# Patient Record
Sex: FEMALE | Race: WHITE | Employment: OTHER | URBAN - METROPOLITAN AREA
[De-identification: names, ages, dates, MRNs, and addresses within clinical notes are randomized per-mention and may not be internally consistent; named-entity substitution may affect disease eponyms.]

---

## 2021-09-24 ENCOUNTER — HOSPITAL ENCOUNTER (INPATIENT)
Facility: CLINIC | Age: 81
Setting detail: SURGERY ADMIT
End: 2021-09-24
Attending: ORTHOPAEDIC SURGERY | Admitting: ORTHOPAEDIC SURGERY
Payer: MEDICARE

## 2021-10-21 DIAGNOSIS — Z11.59 ENCOUNTER FOR SCREENING FOR OTHER VIRAL DISEASES: ICD-10-CM

## 2021-11-09 ENCOUNTER — TRANSFERRED RECORDS (OUTPATIENT)
Dept: HEALTH INFORMATION MANAGEMENT | Facility: CLINIC | Age: 81
End: 2021-11-09
Payer: MEDICARE

## 2021-11-11 VITALS — BODY MASS INDEX: 18.96 KG/M2 | WEIGHT: 107 LBS | HEIGHT: 63 IN

## 2021-11-11 RX ORDER — CHOLECALCIFEROL (VITAMIN D3) 50 MCG
1 TABLET ORAL DAILY
COMMUNITY
End: 2021-12-22

## 2021-11-11 ASSESSMENT — MIFFLIN-ST. JEOR: SCORE: 921.54

## 2021-11-17 ENCOUNTER — MEDICAL CORRESPONDENCE (OUTPATIENT)
Dept: HEALTH INFORMATION MANAGEMENT | Facility: CLINIC | Age: 81
End: 2021-11-17
Payer: MEDICARE

## 2021-11-17 NOTE — PHARMACY-ADMISSION MEDICATION HISTORY
Medication history and patient interview completed by pharmacy intern/student or pre-admitting RN.  Reviewed by pharmacist, including [employer] dispense records, [facility] Care Everywhere, and chart review.       Charles Bingham, Pharm.D., BCPS      Nurse Complete Set By: Ladonna Bill, RN at 11/11/2021 1:58 PM        Prior to Admission medications    Medication Sig Last Dose Taking? Auth Provider   aspirin (ASA) 81 MG EC tablet Take 81 mg by mouth daily  Yes Reported, Patient   CALCIUM CARBONATE-VITAMIN D PO Take 1 tablet by mouth daily   Yes Reported, Patient   vitamin D3 (CHOLECALCIFEROL) 50 mcg (2000 units) tablet Take 1 tablet by mouth daily  Yes Reported, Patient

## 2021-12-22 ASSESSMENT — MIFFLIN-ST. JEOR: SCORE: 917

## 2021-12-25 NOTE — PHARMACY-ADMISSION MEDICATION HISTORY
Medication reconciliation completed by pre-admitting.    Prior to Admission medications    Medication Sig Last Dose Taking? Auth Provider   aspirin (ASA) 81 MG EC tablet Take 81 mg by mouth daily 12/19/2021 Yes Reported, Patient   Calcium-Magnesium-Vitamin D (CALCIUM MAGNESIUM PO) Take 2 capsules by mouth every morning  Yes Reported, Patient   Vitamin D-Vitamin K (VITAMIN K2-VITAMIN D3)  MCG-UNIT CAPS Take 1 tablet by mouth daily  Yes Reported, Patient

## 2021-12-26 ENCOUNTER — LAB (OUTPATIENT)
Dept: URGENT CARE | Facility: URGENT CARE | Age: 81
End: 2021-12-26
Attending: ORTHOPAEDIC SURGERY
Payer: MEDICARE

## 2021-12-26 DIAGNOSIS — Z11.59 ENCOUNTER FOR SCREENING FOR OTHER VIRAL DISEASES: ICD-10-CM

## 2021-12-26 PROCEDURE — U0005 INFEC AGEN DETEC AMPLI PROBE: HCPCS

## 2021-12-26 PROCEDURE — U0003 INFECTIOUS AGENT DETECTION BY NUCLEIC ACID (DNA OR RNA); SEVERE ACUTE RESPIRATORY SYNDROME CORONAVIRUS 2 (SARS-COV-2) (CORONAVIRUS DISEASE [COVID-19]), AMPLIFIED PROBE TECHNIQUE, MAKING USE OF HIGH THROUGHPUT TECHNOLOGIES AS DESCRIBED BY CMS-2020-01-R: HCPCS

## 2021-12-27 LAB — SARS-COV-2 RNA RESP QL NAA+PROBE: NEGATIVE

## 2021-12-29 ENCOUNTER — HOSPITAL ENCOUNTER (INPATIENT)
Facility: CLINIC | Age: 81
LOS: 2 days | Discharge: HOME | DRG: 455 | End: 2021-12-31
Attending: ORTHOPAEDIC SURGERY | Admitting: ORTHOPAEDIC SURGERY
Payer: MEDICARE

## 2021-12-29 ENCOUNTER — APPOINTMENT (OUTPATIENT)
Dept: GENERAL RADIOLOGY | Facility: CLINIC | Age: 81
DRG: 455 | End: 2021-12-29
Attending: ORTHOPAEDIC SURGERY
Payer: MEDICARE

## 2021-12-29 ENCOUNTER — ANESTHESIA EVENT (OUTPATIENT)
Dept: SURGERY | Facility: CLINIC | Age: 81
DRG: 455 | End: 2021-12-29
Payer: MEDICARE

## 2021-12-29 ENCOUNTER — ANESTHESIA (OUTPATIENT)
Dept: SURGERY | Facility: CLINIC | Age: 81
DRG: 455 | End: 2021-12-29
Payer: MEDICARE

## 2021-12-29 DIAGNOSIS — Z98.1 S/P LUMBAR FUSION: Primary | ICD-10-CM

## 2021-12-29 LAB
ABO/RH(D): NORMAL
ABO/RH(D): NORMAL
ANTIBODY SCREEN: NEGATIVE
HGB BLD-MCNC: 13.8 G/DL (ref 11.7–15.7)
SPECIMEN EXPIRATION DATE: NORMAL
SPECIMEN EXPIRATION DATE: NORMAL

## 2021-12-29 PROCEDURE — 0SG0071 FUSION OF LUMBAR VERTEBRAL JOINT WITH AUTOLOGOUS TISSUE SUBSTITUTE, POSTERIOR APPROACH, POSTERIOR COLUMN, OPEN APPROACH: ICD-10-PCS | Performed by: ORTHOPAEDIC SURGERY

## 2021-12-29 PROCEDURE — 258N000003 HC RX IP 258 OP 636: Performed by: ORTHOPAEDIC SURGERY

## 2021-12-29 PROCEDURE — 999N000141 HC STATISTIC PRE-PROCEDURE NURSING ASSESSMENT: Performed by: ORTHOPAEDIC SURGERY

## 2021-12-29 PROCEDURE — 272N000001 HC OR GENERAL SUPPLY STERILE: Performed by: ORTHOPAEDIC SURGERY

## 2021-12-29 PROCEDURE — 250N000011 HC RX IP 250 OP 636: Performed by: PHYSICIAN ASSISTANT

## 2021-12-29 PROCEDURE — 250N000009 HC RX 250: Performed by: NURSE ANESTHETIST, CERTIFIED REGISTERED

## 2021-12-29 PROCEDURE — 0SG10AJ FUSION OF 2 OR MORE LUMBAR VERTEBRAL JOINTS WITH INTERBODY FUSION DEVICE, POSTERIOR APPROACH, ANTERIOR COLUMN, OPEN APPROACH: ICD-10-PCS | Performed by: ORTHOPAEDIC SURGERY

## 2021-12-29 PROCEDURE — 86901 BLOOD TYPING SEROLOGIC RH(D): CPT | Performed by: ANESTHESIOLOGY

## 2021-12-29 PROCEDURE — 250N000011 HC RX IP 250 OP 636: Performed by: NURSE ANESTHETIST, CERTIFIED REGISTERED

## 2021-12-29 PROCEDURE — 86850 RBC ANTIBODY SCREEN: CPT | Performed by: ANESTHESIOLOGY

## 2021-12-29 PROCEDURE — 8E0WXBF COMPUTER ASSISTED PROCEDURE OF TRUNK REGION, WITH FLUOROSCOPY: ICD-10-PCS | Performed by: ORTHOPAEDIC SURGERY

## 2021-12-29 PROCEDURE — C1713 ANCHOR/SCREW BN/BN,TIS/BN: HCPCS | Performed by: ORTHOPAEDIC SURGERY

## 2021-12-29 PROCEDURE — 120N000001 HC R&B MED SURG/OB

## 2021-12-29 PROCEDURE — 85018 HEMOGLOBIN: CPT | Performed by: ANESTHESIOLOGY

## 2021-12-29 PROCEDURE — 370N000017 HC ANESTHESIA TECHNICAL FEE, PER MIN: Performed by: ORTHOPAEDIC SURGERY

## 2021-12-29 PROCEDURE — 258N000003 HC RX IP 258 OP 636: Performed by: ANESTHESIOLOGY

## 2021-12-29 PROCEDURE — C1762 CONN TISS, HUMAN(INC FASCIA): HCPCS | Performed by: ORTHOPAEDIC SURGERY

## 2021-12-29 PROCEDURE — 258N000003 HC RX IP 258 OP 636: Performed by: PHYSICIAN ASSISTANT

## 2021-12-29 PROCEDURE — 00NY0ZZ RELEASE LUMBAR SPINAL CORD, OPEN APPROACH: ICD-10-PCS | Performed by: ORTHOPAEDIC SURGERY

## 2021-12-29 PROCEDURE — 999N000179 XR SURGERY CARM FLUORO LESS THAN 5 MIN W STILLS: Mod: TC

## 2021-12-29 PROCEDURE — 250N000013 HC RX MED GY IP 250 OP 250 PS 637: Performed by: PHYSICIAN ASSISTANT

## 2021-12-29 PROCEDURE — 250N000011 HC RX IP 250 OP 636: Performed by: ANESTHESIOLOGY

## 2021-12-29 PROCEDURE — 710N000009 HC RECOVERY PHASE 1, LEVEL 1, PER MIN: Performed by: ORTHOPAEDIC SURGERY

## 2021-12-29 PROCEDURE — 272N000004 HC RX 272: Performed by: ORTHOPAEDIC SURGERY

## 2021-12-29 PROCEDURE — 01NB0ZZ RELEASE LUMBAR NERVE, OPEN APPROACH: ICD-10-PCS | Performed by: ORTHOPAEDIC SURGERY

## 2021-12-29 PROCEDURE — 360N000085 HC SURGERY LEVEL 5 W/ FLUORO, PER MIN: Performed by: ORTHOPAEDIC SURGERY

## 2021-12-29 PROCEDURE — 258N000003 HC RX IP 258 OP 636: Performed by: NURSE ANESTHETIST, CERTIFIED REGISTERED

## 2021-12-29 PROCEDURE — 250N000009 HC RX 250: Performed by: ORTHOPAEDIC SURGERY

## 2021-12-29 PROCEDURE — 36415 COLL VENOUS BLD VENIPUNCTURE: CPT | Performed by: ANESTHESIOLOGY

## 2021-12-29 PROCEDURE — 0SB20ZZ EXCISION OF LUMBAR VERTEBRAL DISC, OPEN APPROACH: ICD-10-PCS | Performed by: ORTHOPAEDIC SURGERY

## 2021-12-29 PROCEDURE — 0SG00AJ FUSION OF LUMBAR VERTEBRAL JOINT WITH INTERBODY FUSION DEVICE, POSTERIOR APPROACH, ANTERIOR COLUMN, OPEN APPROACH: ICD-10-PCS | Performed by: ORTHOPAEDIC SURGERY

## 2021-12-29 DEVICE — GRAFT BONE MAGNIFUSE 1CMX5CM 7509215: Type: IMPLANTABLE DEVICE | Site: SPINE LUMBAR | Status: FUNCTIONAL

## 2021-12-29 DEVICE — GRAFT BONE CRUSH CANC 15ML 400075: Type: IMPLANTABLE DEVICE | Site: SPINE LUMBAR | Status: FUNCTIONAL

## 2021-12-29 DEVICE — IMPLANTABLE DEVICE: Type: IMPLANTABLE DEVICE | Site: SPINE LUMBAR | Status: FUNCTIONAL

## 2021-12-29 DEVICE — GRAFT BONE PUTTY DBX 10ML 038100: Type: IMPLANTABLE DEVICE | Site: SPINE LUMBAR | Status: FUNCTIONAL

## 2021-12-29 RX ORDER — SODIUM CHLORIDE, SODIUM LACTATE, POTASSIUM CHLORIDE, CALCIUM CHLORIDE 600; 310; 30; 20 MG/100ML; MG/100ML; MG/100ML; MG/100ML
INJECTION, SOLUTION INTRAVENOUS CONTINUOUS
Status: DISCONTINUED | OUTPATIENT
Start: 2021-12-29 | End: 2021-12-29 | Stop reason: HOSPADM

## 2021-12-29 RX ORDER — LIDOCAINE 40 MG/G
CREAM TOPICAL
Status: DISCONTINUED | OUTPATIENT
Start: 2021-12-29 | End: 2021-12-29 | Stop reason: HOSPADM

## 2021-12-29 RX ORDER — HYDROXYZINE HYDROCHLORIDE 10 MG/1
10 TABLET, FILM COATED ORAL EVERY 6 HOURS PRN
Status: DISCONTINUED | OUTPATIENT
Start: 2021-12-29 | End: 2021-12-31 | Stop reason: HOSPADM

## 2021-12-29 RX ORDER — NALOXONE HYDROCHLORIDE 0.4 MG/ML
0.4 INJECTION, SOLUTION INTRAMUSCULAR; INTRAVENOUS; SUBCUTANEOUS
Status: DISCONTINUED | OUTPATIENT
Start: 2021-12-29 | End: 2021-12-31 | Stop reason: HOSPADM

## 2021-12-29 RX ORDER — PROPOFOL 10 MG/ML
INJECTION, EMULSION INTRAVENOUS PRN
Status: DISCONTINUED | OUTPATIENT
Start: 2021-12-29 | End: 2021-12-29

## 2021-12-29 RX ORDER — ONDANSETRON 4 MG/1
4 TABLET, ORALLY DISINTEGRATING ORAL EVERY 6 HOURS PRN
Status: DISCONTINUED | OUTPATIENT
Start: 2021-12-29 | End: 2021-12-31 | Stop reason: HOSPADM

## 2021-12-29 RX ORDER — ACETAMINOPHEN 325 MG/1
650 TABLET ORAL EVERY 4 HOURS PRN
Status: DISCONTINUED | OUTPATIENT
Start: 2022-01-01 | End: 2021-12-31 | Stop reason: HOSPADM

## 2021-12-29 RX ORDER — LIDOCAINE 40 MG/G
CREAM TOPICAL
Status: DISCONTINUED | OUTPATIENT
Start: 2021-12-29 | End: 2021-12-31 | Stop reason: HOSPADM

## 2021-12-29 RX ORDER — NALOXONE HYDROCHLORIDE 0.4 MG/ML
0.2 INJECTION, SOLUTION INTRAMUSCULAR; INTRAVENOUS; SUBCUTANEOUS
Status: DISCONTINUED | OUTPATIENT
Start: 2021-12-29 | End: 2021-12-31 | Stop reason: HOSPADM

## 2021-12-29 RX ORDER — SODIUM CHLORIDE 9 MG/ML
INJECTION, SOLUTION INTRAVENOUS CONTINUOUS
Status: DISCONTINUED | OUTPATIENT
Start: 2021-12-29 | End: 2021-12-31 | Stop reason: HOSPADM

## 2021-12-29 RX ORDER — DEXAMETHASONE SODIUM PHOSPHATE 4 MG/ML
INJECTION, SOLUTION INTRA-ARTICULAR; INTRALESIONAL; INTRAMUSCULAR; INTRAVENOUS; SOFT TISSUE PRN
Status: DISCONTINUED | OUTPATIENT
Start: 2021-12-29 | End: 2021-12-29

## 2021-12-29 RX ORDER — NEOSTIGMINE METHYLSULFATE 1 MG/ML
VIAL (ML) INJECTION PRN
Status: DISCONTINUED | OUTPATIENT
Start: 2021-12-29 | End: 2021-12-29

## 2021-12-29 RX ORDER — CEFAZOLIN SODIUM/WATER 2 G/20 ML
2 SYRINGE (ML) INTRAVENOUS SEE ADMIN INSTRUCTIONS
Status: DISCONTINUED | OUTPATIENT
Start: 2021-12-29 | End: 2021-12-29 | Stop reason: HOSPADM

## 2021-12-29 RX ORDER — GLYCOPYRROLATE 0.2 MG/ML
INJECTION, SOLUTION INTRAMUSCULAR; INTRAVENOUS PRN
Status: DISCONTINUED | OUTPATIENT
Start: 2021-12-29 | End: 2021-12-29

## 2021-12-29 RX ORDER — GABAPENTIN 100 MG/1
100 CAPSULE ORAL
Status: COMPLETED | OUTPATIENT
Start: 2021-12-29 | End: 2021-12-29

## 2021-12-29 RX ORDER — OXYCODONE HYDROCHLORIDE 5 MG/1
5 TABLET ORAL EVERY 4 HOURS PRN
Status: DISCONTINUED | OUTPATIENT
Start: 2021-12-29 | End: 2021-12-29 | Stop reason: HOSPADM

## 2021-12-29 RX ORDER — POLYETHYLENE GLYCOL 3350 17 G/17G
17 POWDER, FOR SOLUTION ORAL DAILY
Status: DISCONTINUED | OUTPATIENT
Start: 2021-12-30 | End: 2021-12-31 | Stop reason: HOSPADM

## 2021-12-29 RX ORDER — ONDANSETRON 2 MG/ML
INJECTION INTRAMUSCULAR; INTRAVENOUS PRN
Status: DISCONTINUED | OUTPATIENT
Start: 2021-12-29 | End: 2021-12-29

## 2021-12-29 RX ORDER — BISACODYL 10 MG
10 SUPPOSITORY, RECTAL RECTAL DAILY PRN
Status: DISCONTINUED | OUTPATIENT
Start: 2021-12-29 | End: 2021-12-31 | Stop reason: HOSPADM

## 2021-12-29 RX ORDER — LIDOCAINE HYDROCHLORIDE 10 MG/ML
INJECTION, SOLUTION EPIDURAL; INFILTRATION; INTRACAUDAL; PERINEURAL PRN
Status: DISCONTINUED | OUTPATIENT
Start: 2021-12-29 | End: 2021-12-29

## 2021-12-29 RX ORDER — EPHEDRINE SULFATE 50 MG/ML
INJECTION, SOLUTION INTRAMUSCULAR; INTRAVENOUS; SUBCUTANEOUS PRN
Status: DISCONTINUED | OUTPATIENT
Start: 2021-12-29 | End: 2021-12-29

## 2021-12-29 RX ORDER — OXYCODONE HYDROCHLORIDE 5 MG/1
10 TABLET ORAL EVERY 4 HOURS PRN
Status: DISCONTINUED | OUTPATIENT
Start: 2021-12-29 | End: 2021-12-31 | Stop reason: HOSPADM

## 2021-12-29 RX ORDER — FENTANYL CITRATE 50 UG/ML
INJECTION, SOLUTION INTRAMUSCULAR; INTRAVENOUS PRN
Status: DISCONTINUED | OUTPATIENT
Start: 2021-12-29 | End: 2021-12-29

## 2021-12-29 RX ORDER — PROCHLORPERAZINE MALEATE 5 MG
5 TABLET ORAL EVERY 6 HOURS PRN
Status: DISCONTINUED | OUTPATIENT
Start: 2021-12-29 | End: 2021-12-31 | Stop reason: HOSPADM

## 2021-12-29 RX ORDER — BUPIVACAINE HYDROCHLORIDE AND EPINEPHRINE 2.5; 5 MG/ML; UG/ML
INJECTION, SOLUTION EPIDURAL; INFILTRATION; INTRACAUDAL; PERINEURAL PRN
Status: DISCONTINUED | OUTPATIENT
Start: 2021-12-29 | End: 2021-12-29 | Stop reason: HOSPADM

## 2021-12-29 RX ORDER — ONDANSETRON 4 MG/1
4 TABLET, ORALLY DISINTEGRATING ORAL EVERY 30 MIN PRN
Status: DISCONTINUED | OUTPATIENT
Start: 2021-12-29 | End: 2021-12-29 | Stop reason: HOSPADM

## 2021-12-29 RX ORDER — HYDROMORPHONE HCL IN WATER/PF 6 MG/30 ML
0.2 PATIENT CONTROLLED ANALGESIA SYRINGE INTRAVENOUS EVERY 5 MIN PRN
Status: DISCONTINUED | OUTPATIENT
Start: 2021-12-29 | End: 2021-12-29 | Stop reason: HOSPADM

## 2021-12-29 RX ORDER — OXYCODONE HYDROCHLORIDE 5 MG/1
5 TABLET ORAL EVERY 4 HOURS PRN
Status: DISCONTINUED | OUTPATIENT
Start: 2021-12-29 | End: 2021-12-31 | Stop reason: HOSPADM

## 2021-12-29 RX ORDER — CEFAZOLIN SODIUM/WATER 2 G/20 ML
2 SYRINGE (ML) INTRAVENOUS
Status: DISCONTINUED | OUTPATIENT
Start: 2021-12-29 | End: 2021-12-29 | Stop reason: HOSPADM

## 2021-12-29 RX ORDER — ONDANSETRON 2 MG/ML
4 INJECTION INTRAMUSCULAR; INTRAVENOUS EVERY 30 MIN PRN
Status: DISCONTINUED | OUTPATIENT
Start: 2021-12-29 | End: 2021-12-29 | Stop reason: HOSPADM

## 2021-12-29 RX ORDER — CEFAZOLIN SODIUM 1 G/50ML
1 INJECTION, SOLUTION INTRAVENOUS EVERY 8 HOURS
Status: COMPLETED | OUTPATIENT
Start: 2021-12-29 | End: 2021-12-30

## 2021-12-29 RX ORDER — ONDANSETRON 2 MG/ML
4 INJECTION INTRAMUSCULAR; INTRAVENOUS EVERY 6 HOURS PRN
Status: DISCONTINUED | OUTPATIENT
Start: 2021-12-29 | End: 2021-12-31 | Stop reason: HOSPADM

## 2021-12-29 RX ORDER — HYDROMORPHONE HCL IN WATER/PF 6 MG/30 ML
0.4 PATIENT CONTROLLED ANALGESIA SYRINGE INTRAVENOUS
Status: DISCONTINUED | OUTPATIENT
Start: 2021-12-29 | End: 2021-12-31 | Stop reason: HOSPADM

## 2021-12-29 RX ORDER — VITAMIN B COMPLEX
25 TABLET ORAL 2 TIMES DAILY
Status: DISCONTINUED | OUTPATIENT
Start: 2021-12-29 | End: 2021-12-31 | Stop reason: HOSPADM

## 2021-12-29 RX ORDER — FENTANYL CITRATE 50 UG/ML
25 INJECTION, SOLUTION INTRAMUSCULAR; INTRAVENOUS EVERY 5 MIN PRN
Status: DISCONTINUED | OUTPATIENT
Start: 2021-12-29 | End: 2021-12-29 | Stop reason: HOSPADM

## 2021-12-29 RX ORDER — AMOXICILLIN 250 MG
1 CAPSULE ORAL 2 TIMES DAILY
Status: DISCONTINUED | OUTPATIENT
Start: 2021-12-29 | End: 2021-12-31 | Stop reason: HOSPADM

## 2021-12-29 RX ORDER — ACETAMINOPHEN 325 MG/1
975 TABLET ORAL EVERY 8 HOURS
Status: DISCONTINUED | OUTPATIENT
Start: 2021-12-29 | End: 2021-12-31 | Stop reason: HOSPADM

## 2021-12-29 RX ORDER — HYDROMORPHONE HCL IN WATER/PF 6 MG/30 ML
0.2 PATIENT CONTROLLED ANALGESIA SYRINGE INTRAVENOUS
Status: DISCONTINUED | OUTPATIENT
Start: 2021-12-29 | End: 2021-12-31 | Stop reason: HOSPADM

## 2021-12-29 RX ORDER — MAGNESIUM HYDROXIDE 1200 MG/15ML
LIQUID ORAL PRN
Status: DISCONTINUED | OUTPATIENT
Start: 2021-12-29 | End: 2021-12-29 | Stop reason: HOSPADM

## 2021-12-29 RX ORDER — PROPOFOL 10 MG/ML
INJECTION, EMULSION INTRAVENOUS CONTINUOUS PRN
Status: DISCONTINUED | OUTPATIENT
Start: 2021-12-29 | End: 2021-12-29

## 2021-12-29 RX ADMIN — SODIUM CHLORIDE, POTASSIUM CHLORIDE, SODIUM LACTATE AND CALCIUM CHLORIDE: 600; 310; 30; 20 INJECTION, SOLUTION INTRAVENOUS at 10:00

## 2021-12-29 RX ADMIN — GABAPENTIN 100 MG: 100 CAPSULE ORAL at 08:59

## 2021-12-29 RX ADMIN — FENTANYL CITRATE 50 MCG: 50 INJECTION, SOLUTION INTRAMUSCULAR; INTRAVENOUS at 12:51

## 2021-12-29 RX ADMIN — SENNOSIDES AND DOCUSATE SODIUM 1 TABLET: 50; 8.6 TABLET ORAL at 20:59

## 2021-12-29 RX ADMIN — NEOSTIGMINE METHYLSULFATE 3.5 MG: 1 INJECTION, SOLUTION INTRAVENOUS at 12:30

## 2021-12-29 RX ADMIN — CEFAZOLIN SODIUM 1 G: 1 INJECTION, SOLUTION INTRAVENOUS at 17:59

## 2021-12-29 RX ADMIN — Medication 5 MG: at 11:08

## 2021-12-29 RX ADMIN — Medication 5 MG: at 10:54

## 2021-12-29 RX ADMIN — GLYCOPYRROLATE 0.5 MG: 0.2 INJECTION, SOLUTION INTRAMUSCULAR; INTRAVENOUS at 12:31

## 2021-12-29 RX ADMIN — DEXAMETHASONE SODIUM PHOSPHATE 4 MG: 4 INJECTION, SOLUTION INTRA-ARTICULAR; INTRALESIONAL; INTRAMUSCULAR; INTRAVENOUS; SOFT TISSUE at 10:08

## 2021-12-29 RX ADMIN — FENTANYL CITRATE 50 MCG: 50 INJECTION, SOLUTION INTRAMUSCULAR; INTRAVENOUS at 10:08

## 2021-12-29 RX ADMIN — GLYCOPYRROLATE 0.1 MG: 0.2 INJECTION, SOLUTION INTRAMUSCULAR; INTRAVENOUS at 10:08

## 2021-12-29 RX ADMIN — FENTANYL CITRATE 25 MCG: 50 INJECTION, SOLUTION INTRAMUSCULAR; INTRAVENOUS at 14:33

## 2021-12-29 RX ADMIN — SODIUM CHLORIDE: 9 INJECTION, SOLUTION INTRAVENOUS at 17:59

## 2021-12-29 RX ADMIN — Medication 2 G: at 09:59

## 2021-12-29 RX ADMIN — SODIUM CHLORIDE, POTASSIUM CHLORIDE, SODIUM LACTATE AND CALCIUM CHLORIDE: 600; 310; 30; 20 INJECTION, SOLUTION INTRAVENOUS at 11:03

## 2021-12-29 RX ADMIN — PHENYLEPHRINE HYDROCHLORIDE 100 MCG: 10 INJECTION INTRAVENOUS at 10:28

## 2021-12-29 RX ADMIN — PHENYLEPHRINE HYDROCHLORIDE 100 MCG: 10 INJECTION INTRAVENOUS at 10:22

## 2021-12-29 RX ADMIN — ONDANSETRON HYDROCHLORIDE 4 MG: 2 INJECTION, SOLUTION INTRAVENOUS at 12:11

## 2021-12-29 RX ADMIN — FENTANYL CITRATE 50 MCG: 50 INJECTION, SOLUTION INTRAMUSCULAR; INTRAVENOUS at 11:09

## 2021-12-29 RX ADMIN — PHENYLEPHRINE HYDROCHLORIDE 0.2 MCG/KG/MIN: 10 INJECTION INTRAVENOUS at 10:41

## 2021-12-29 RX ADMIN — PHENYLEPHRINE HYDROCHLORIDE 100 MCG: 10 INJECTION INTRAVENOUS at 10:41

## 2021-12-29 RX ADMIN — ROCURONIUM BROMIDE 40 MG: 50 INJECTION, SOLUTION INTRAVENOUS at 10:08

## 2021-12-29 RX ADMIN — PROPOFOL 110 MG: 10 INJECTION, EMULSION INTRAVENOUS at 10:08

## 2021-12-29 RX ADMIN — FENTANYL CITRATE 50 MCG: 50 INJECTION, SOLUTION INTRAMUSCULAR; INTRAVENOUS at 12:05

## 2021-12-29 RX ADMIN — SUGAMMADEX 100 MG: 100 INJECTION, SOLUTION INTRAVENOUS at 12:43

## 2021-12-29 RX ADMIN — FENTANYL CITRATE 25 MCG: 50 INJECTION, SOLUTION INTRAMUSCULAR; INTRAVENOUS at 13:28

## 2021-12-29 RX ADMIN — PHENYLEPHRINE HYDROCHLORIDE 100 MCG: 10 INJECTION INTRAVENOUS at 10:49

## 2021-12-29 RX ADMIN — FENTANYL CITRATE 50 MCG: 50 INJECTION, SOLUTION INTRAMUSCULAR; INTRAVENOUS at 10:28

## 2021-12-29 RX ADMIN — ACETAMINOPHEN 975 MG: 325 TABLET, FILM COATED ORAL at 21:01

## 2021-12-29 RX ADMIN — PROPOFOL 75 MCG/KG/MIN: 10 INJECTION, EMULSION INTRAVENOUS at 10:25

## 2021-12-29 RX ADMIN — LIDOCAINE HYDROCHLORIDE 20 MG: 10 INJECTION, SOLUTION EPIDURAL; INFILTRATION; INTRACAUDAL; PERINEURAL at 10:08

## 2021-12-29 ASSESSMENT — ACTIVITIES OF DAILY LIVING (ADL)
ADLS_ACUITY_SCORE: 5
ADLS_ACUITY_SCORE: 4
ADLS_ACUITY_SCORE: 5

## 2021-12-29 ASSESSMENT — MIFFLIN-ST. JEOR: SCORE: 910.65

## 2021-12-29 NOTE — ANESTHESIA PROCEDURE NOTES
Airway       Patient location during procedure: OR       Procedure Start/Stop Times: 12/29/2021 10:12 AM  Staff -        Performed By: CRNA  Consent for Airway        Urgency: elective  Indications and Patient Condition       Indications for airway management: luan-procedural       Induction type:intravenous       Mask difficulty assessment: 2 - vent by mask + OA or adjuvant +/- NMBA    Final Airway Details       Final airway type: endotracheal airway       Successful airway: ETT - single  Endotracheal Airway Details        ETT size (mm): 7.0       Successful intubation technique: direct laryngoscopy       DL Blade Type: Davison 2       Grade View of Cords: 1       Adjucts: stylet       Position: Right       Measured from: gums/teeth       Secured at (cm): 20       Bite block used: Soft    Post intubation assessment        Placement verified by: capnometry        Number of attempts at approach: 1       Secured with: plastic tape       Ease of procedure: easy       Dentition: Intact

## 2021-12-29 NOTE — ANESTHESIA CARE TRANSFER NOTE
Patient: Aurora Williamson    Procedure: Procedure(s):  Lumbar 4-5 posterior lumbar instrumented fusion with  interbody cage and with laminectomies       Diagnosis: DJD (degenerative joint disease) [M19.90]  Stenosis, spinal, lumbar [M48.061]  Radiculopathy [M54.10]  Spondylolisthesis of lumbar region [M43.16]  Diagnosis Additional Information: No value filed.    Anesthesia Type:   General     Note:    Oropharynx: oropharynx clear of all foreign objects  Level of Consciousness: awake  Oxygen Supplementation: face mask    Independent Airway: airway patency satisfactory and stable  Dentition: dentition unchanged  Vital Signs Stable: post-procedure vital signs reviewed and stable  Report to RN Given: handoff report given  Patient transferred to: PACU    Handoff Report: Identifed the Patient, Identified the Reponsible Provider, Reviewed the pertinent medical history, Discussed the surgical course, Reviewed Intra-OP anesthesia mangement and issues during anesthesia, Set expectations for post-procedure period and Allowed opportunity for questions and acknowledgement of understanding      Vitals:  Vitals Value Taken Time   /52 12/29/21 1245   Temp     Pulse 100 12/29/21 1250   Resp 18 12/29/21 1250   SpO2 100 % 12/29/21 1250   Vitals shown include unvalidated device data.    Electronically Signed By: NIC Lamb CRNA  December 29, 2021  12:51 PM

## 2021-12-29 NOTE — ANESTHESIA POSTPROCEDURE EVALUATION
Patient: Aurora Williamson    Procedure: Procedure(s):  Lumbar 4-5 posterior lumbar instrumented fusion with  interbody cage and with laminectomies       Diagnosis:DJD (degenerative joint disease) [M19.90]  Stenosis, spinal, lumbar [M48.061]  Radiculopathy [M54.10]  Spondylolisthesis of lumbar region [M43.16]  Diagnosis Additional Information: No value filed.    Anesthesia Type:  General    Note:  Disposition: Inpatient   Postop Pain Control: Uneventful            Sign Out: Well controlled pain   PONV: No   Neuro/Psych: Uneventful            Sign Out: Acceptable/Baseline neuro status   Airway/Respiratory: Uneventful            Sign Out: Acceptable/Baseline resp. status   CV/Hemodynamics: Uneventful            Sign Out: Acceptable CV status; No obvious hypovolemia; No obvious fluid overload   Other NRE: NONE   DID A NON-ROUTINE EVENT OCCUR? No           Last vitals:  Vitals Value Taken Time   /55 12/29/21 1530   Temp 97.8  F (36.6  C) 12/29/21 1446   Pulse 69 12/29/21 1446   Resp 14 12/29/21 1530   SpO2 98 % 12/29/21 1530       Electronically Signed By: Tomasz Singh MD  December 29, 2021  4:34 PM

## 2021-12-29 NOTE — PROGRESS NOTES
SPIRITUAL HEALTH SERVICES  [facility]  PACU    Post-Op visit with pt.  Provided spiritual support, prayer.   Oriented to Spiritual Health Services and availability for emotional/spiritual support during hospital stay.         Dorian Hunter MA  Staff   Pager: [phone]  Phone: [phone]

## 2021-12-29 NOTE — BRIEF OP NOTE
[facility] Brief Operative Note    Pre-operative diagnosis: DJD (degenerative joint disease) [M19.90]  Stenosis, spinal, lumbar [M48.061]  Radiculopathy [M54.10]  Spondylolisthesis of lumbar region [M43.16]     Post-operative diagnosis * No post-op diagnosis entered *  same   Procedure: Procedure(s):  Lumbar 4-5 posterior lumbar instrumented fusion with  interbody cage and with laminectomies   Surgeon(s): Surgeon(s) and Role:     * Augustine Wyatt MD - Primary     * Lucas Dumont PA-C - Assisting   Estimated blood loss: 25 mL    Specimens: * No specimens in log *   Findings: No comp    Augustine Wyatt MD

## 2021-12-29 NOTE — ANESTHESIA PREPROCEDURE EVALUATION
Anesthesia Pre-Procedure Evaluation    Patient: Aurora Williamson   MRN: [id number] : 1940        Preoperative Diagnosis: DJD (degenerative joint disease) [M19.90]  Stenosis, spinal, lumbar [M48.061]  Radiculopathy [M54.10]  Spondylolisthesis of lumbar region [M43.16]    Procedure : Procedure(s):  Lumbar 4-5 posterior lumbar instrumented fusion with or without interbody cage and with or without laminectomies          Past Medical History:   Diagnosis Date     Amaurosis fugax      Arthritis      PONV (postoperative nausea and vomiting)      Subclavian aneurysm (H)       Past Surgical History:   Procedure Laterality Date     EYE SURGERY      cataract     THORACIC AORTIC ANEURYSM REPAIR  2020      Allergies   Allergen Reactions     Shellfish Allergy Nausea and Vomiting      Social History     Tobacco Use     Smoking status: Never Smoker     Smokeless tobacco: Never Used   Substance Use Topics     Alcohol use: Yes     Comment: occasional      Wt Readings from Last 1 Encounters:   21 47.4 kg (104 lb 9.6 oz)        Anesthesia Evaluation   Pt has had prior anesthetic. Type: General.    History of anesthetic complications  - PONV.      ROS/MED HX  ENT/Pulmonary:  - neg pulmonary ROS     Neurologic:  - neg neurologic ROS     Cardiovascular:     (+) --CAD --stent-.     METS/Exercise Tolerance:     Hematologic:  - neg hematologic  ROS     Musculoskeletal:   (+) arthritis,     GI/Hepatic:  - neg GI/hepatic ROS     Renal/Genitourinary:  - neg Renal ROS     Endo:  - neg endo ROS     Psychiatric/Substance Use:  - neg psychiatric ROS     Infectious Disease:  - neg infectious disease ROS     Malignancy:       Other:            Physical Exam    Airway        Mallampati: II   TM distance: > 3 FB   Neck ROM: full   Mouth opening: > 3 cm    Respiratory Devices and Support         Dental  no notable dental history         Cardiovascular   cardiovascular exam normal          Pulmonary   pulmonary exam normal                 OUTSIDE LABS:  CBC:   Lab Results   Component Value Date    HGB 13.8 12/29/2021     BMP: No results found for: NA, POTASSIUM, CHLORIDE, CO2, BUN, CR, GLC  COAGS: No results found for: PTT, INR, FIBR  POC: No results found for: BGM, HCG, HCGS  HEPATIC: No results found for: ALBUMIN, PROTTOTAL, ALT, AST, GGT, ALKPHOS, BILITOTAL, BILIDIRECT, CELIA  OTHER: No results found for: PH, LACT, A1C, KOMAL, PHOS, MAG, LIPASE, AMYLASE, TSH, T4, T3, CRP, SED    Anesthesia Plan    ASA Status:  3      Anesthesia Type: General.     - Airway: ETT   Induction: Intravenous.   Maintenance: Balanced.        Consents    Anesthesia Plan(s) and associated risks, benefits, and realistic alternatives discussed. Questions answered and patient/representative(s) expressed understanding.    - Discussed:     - Discussed with:  Patient      - Extended Intubation/Ventilatory Support Discussed: No.      - Patient is DNR/DNI Status: No    Use of blood products discussed: No .     Postoperative Care    Pain management: Oral pain medications, IV analgesics.   PONV prophylaxis: Ondansetron (or other 5HT-3), Dexamethasone or Solumedrol     Comments:                Tomasz Singh MD

## 2021-12-30 ENCOUNTER — APPOINTMENT (OUTPATIENT)
Dept: PHYSICAL THERAPY | Facility: CLINIC | Age: 81
DRG: 455 | End: 2021-12-30
Attending: ORTHOPAEDIC SURGERY
Payer: MEDICARE

## 2021-12-30 ENCOUNTER — APPOINTMENT (OUTPATIENT)
Dept: OCCUPATIONAL THERAPY | Facility: CLINIC | Age: 81
DRG: 455 | End: 2021-12-30
Attending: ORTHOPAEDIC SURGERY
Payer: MEDICARE

## 2021-12-30 LAB
GLUCOSE BLD-MCNC: 95 MG/DL (ref 70–99)
HGB BLD-MCNC: 10.8 G/DL (ref 11.7–15.7)

## 2021-12-30 PROCEDURE — 250N000011 HC RX IP 250 OP 636: Performed by: PHYSICIAN ASSISTANT

## 2021-12-30 PROCEDURE — 97161 PT EVAL LOW COMPLEX 20 MIN: CPT | Mod: GP | Performed by: PHYSICAL THERAPIST

## 2021-12-30 PROCEDURE — 82947 ASSAY GLUCOSE BLOOD QUANT: CPT | Performed by: ORTHOPAEDIC SURGERY

## 2021-12-30 PROCEDURE — 250N000013 HC RX MED GY IP 250 OP 250 PS 637: Performed by: PHYSICIAN ASSISTANT

## 2021-12-30 PROCEDURE — 97535 SELF CARE MNGMENT TRAINING: CPT | Mod: GO

## 2021-12-30 PROCEDURE — 120N000001 HC R&B MED SURG/OB

## 2021-12-30 PROCEDURE — 97165 OT EVAL LOW COMPLEX 30 MIN: CPT | Mod: GO

## 2021-12-30 PROCEDURE — 97530 THERAPEUTIC ACTIVITIES: CPT | Mod: GP | Performed by: PHYSICAL THERAPIST

## 2021-12-30 PROCEDURE — 99207 PR NON-BILLABLE SERV PER CHARTING: CPT | Performed by: NURSE PRACTITIONER

## 2021-12-30 PROCEDURE — 97116 GAIT TRAINING THERAPY: CPT | Mod: GP | Performed by: PHYSICAL THERAPIST

## 2021-12-30 PROCEDURE — 36415 COLL VENOUS BLD VENIPUNCTURE: CPT | Performed by: ORTHOPAEDIC SURGERY

## 2021-12-30 PROCEDURE — 258N000003 HC RX IP 258 OP 636: Performed by: PHYSICIAN ASSISTANT

## 2021-12-30 PROCEDURE — 85018 HEMOGLOBIN: CPT | Performed by: PHYSICIAN ASSISTANT

## 2021-12-30 RX ORDER — ACETAMINOPHEN 325 MG/1
650 TABLET ORAL EVERY 4 HOURS PRN
Qty: 100 TABLET | Refills: 0 | Status: SHIPPED | OUTPATIENT
Start: 2021-12-30

## 2021-12-30 RX ORDER — TRAMADOL HYDROCHLORIDE 50 MG/1
50 TABLET ORAL EVERY 6 HOURS PRN
Qty: 15 TABLET | Refills: 0 | Status: SHIPPED | OUTPATIENT
Start: 2021-12-30 | End: 2021-12-31

## 2021-12-30 RX ORDER — AMOXICILLIN 250 MG
1-2 CAPSULE ORAL 2 TIMES DAILY
Qty: 30 TABLET | Refills: 0 | Status: SHIPPED | OUTPATIENT
Start: 2021-12-30

## 2021-12-30 RX ADMIN — SENNOSIDES AND DOCUSATE SODIUM 1 TABLET: 50; 8.6 TABLET ORAL at 19:45

## 2021-12-30 RX ADMIN — ACETAMINOPHEN 975 MG: 325 TABLET, FILM COATED ORAL at 18:03

## 2021-12-30 RX ADMIN — Medication 1 TABLET: at 18:03

## 2021-12-30 RX ADMIN — SENNOSIDES AND DOCUSATE SODIUM 1 TABLET: 50; 8.6 TABLET ORAL at 08:27

## 2021-12-30 RX ADMIN — Medication 25 MCG: at 08:27

## 2021-12-30 RX ADMIN — Medication 1 TABLET: at 13:59

## 2021-12-30 RX ADMIN — ACETAMINOPHEN 975 MG: 325 TABLET, FILM COATED ORAL at 08:27

## 2021-12-30 RX ADMIN — Medication 1 TABLET: at 08:27

## 2021-12-30 RX ADMIN — SODIUM CHLORIDE: 9 INJECTION, SOLUTION INTRAVENOUS at 02:51

## 2021-12-30 RX ADMIN — CEFAZOLIN SODIUM 1 G: 1 INJECTION, SOLUTION INTRAVENOUS at 02:47

## 2021-12-30 RX ADMIN — Medication 25 MCG: at 19:45

## 2021-12-30 ASSESSMENT — ACTIVITIES OF DAILY LIVING (ADL)
ADLS_ACUITY_SCORE: 4
PREVIOUS_RESPONSIBILITIES: MEAL PREP;HOUSEKEEPING;LAUNDRY;MEDICATION MANAGEMENT;FINANCES;DRIVING
ADLS_ACUITY_SCORE: 4

## 2021-12-30 NOTE — PLAN OF CARE
Patient vital signs are at baseline: Yes on 2L O2.   Patient able to ambulate as they were prior to admission or with assist devices provided by therapies during their stay:  No,  Reason:  Not OOB since surgery.  Patient MUST void prior to discharge:  No,  Reason:  Campbell catheter in place.  Patient able to tolerate oral intake:  Yes; minimal intake; soft foods and liquids.  Pain has adequate pain control using Oral analgesics:  Yes w/ scheduled tylenol.    Pt Ox4. Dressing CDI. CMS intact. Hemovac set to suction. No discharge plans in place.

## 2021-12-30 NOTE — DISCHARGE INSTRUCTIONS
Incision Instructions     Your incision is covered with an Aquacel dressing. This is a waterproof dressing that you are able to shower with. Do not submerge your dressing in water. Do not remove dressing until you are seen in clinic for your two week post op visit.     However, if you notice a significant amount of drainage on your dressing, or there is any concern for possible incision infection, remove to inspect the incision.    If you do remove the dressing prior to your two week visit, please cover with simple dressing. We suggest a folded piece of gauze with a few pieces of tape to hold in place. This will allow the incision to remain protected. Please make sure to cover your dressing with plastic/waterproof dressing to keep it waterproof if you have removed the initial dressing while in the shower. We ask that you continue to waterproof it until you are seen at your two week post op appointment.     Activity Instructions   Minimize bending, lifting, twisting. No lifting greater than 10 lbs. Remember, 1 gallon of milk is 8 lbs.    Please call our office at [phone] should you develop the following issues:  1.) Increased/persistent redness, bleeding, localized warmth, increased swelling, and/or drainage (yellow/clear/odorous) incision site.   2.) Increased pain not controlled with oral pain medications   3.) Persistent headache, dizziness, lightheaded  4.) Persistent constipation despite taking OTC stool softeners as directed  5.) Calf pain/swollen/hard/warm area, swelling chest pain or shortness of breath  6.) Increased/persistent numbness or tingling in arm or legs, weakness in extremities or falls  7.) Generalized feelings of illness  8.) Persistent fever, chills, sweats, Temp 101 or greater  9.) Trouble voiding, incontinence of bowel and/or bladder  10.) Too sleepy-could be amount of pain medication  11.) If unable to wake call 911    Other instructions:  1.) No heavy lifting, nothing more than 6-10lbs.  Minimize your bending, lifting, and twisting. Attempt to avoid prolonged period of sitting. Follow physical therapy restrictions and exercises - slowly increase your activity.   2.) Avoid sitting or laying in one position too long, walk as tolerated, log roll  3.) Wear brace when up per physical therapy, inspect skin under brace daily and call md if sore area starts  4.) Take an over the counter stool softener as directed while on narcotics to prevent constipation or to stay regular. Take a suppository or laxative if no bowel movement in 2 days despite taking softener     Follow Up   Follow up with Lucas Dumont PA-C in two weeks at [facility]. Please call Laci [phone] to schedule.

## 2021-12-30 NOTE — PLAN OF CARE
Occupational Therapy Discharge Summary    Reason for therapy discharge:    All goals and outcomes met, no further needs identified.    Progress towards therapy goal(s). See goals on Care Plan in [facility] electronic health record for goal details.  Goals met    Therapy recommendation(s):    Defer to neuro/surg team.

## 2021-12-30 NOTE — PROGRESS NOTES
"Ortho Rounding Note    S: Pt in bed resting comfortably. Pain controlled with PO meds. Denies n/t to the bilateral LE. Denies SOB, CP, n/v/f/c.     O:  Vital signs:   Blood pressure 105/41, pulse 57, temperature 97  F (36.1  C), temperature source Temporal, resp. rate 11, height 1.588 m (5' 2.5\"), weight 47.4 kg (104 lb 9.6 oz), SpO2 99 %.  Estimated body mass index is 18.83 kg/m  as calculated from the following:    Height as of this encounter: 1.588 m (5' 2.5\").    Weight as of this encounter: 47.4 kg (104 lb 9.6 oz).      Intake/Output Summary (Last 24 hours) at 12/30/2021 0726  Last data filed at 12/30/2021 0703  Gross per 24 hour   Intake 2260 ml   Output 2515 ml   Net -255 ml         Drain intact   Dressings c/d/i  5/5 motor and SPLT in BL UE and LE    A:  POD #1 s/p L4-5 TLIF    P:  General: Patient doing well this AM. No ortho concerns. Plan for continued PT/OT and daily cares.   Pain: PO  Act: up ad alfredo, with therapy  DVT: Mech only  ID: routine postop abx to be completed 24 hours after surgery  Dispo: Plan to home likely tomorrow.     Appreciate Medicine consult for medical management    Lucas Dumont PA-C    "

## 2021-12-30 NOTE — PROGRESS NOTES
12/30/21 1513   Quick Adds   Type of Visit Initial Occupational Therapy Evaluation   Living Environment   People in home spouse   Current Living Arrangements house   Home Accessibility stairs to enter home;stairs within home   Number of Stairs, Main Entrance 2   Stair Railings, Main Entrance railings safe and in good condition   Number of Stairs, Within Home, Primary greater than 10 stairs   Stair Railings, Within Home, Primary railings safe and in good condition   Transportation Anticipated car, drives self   Living Environment Comments Pt lives with spouse in house, 2 FREDDY, full flight within, RTS, tub/shower w/ grab bar and shower chair.    Self-Care   Usual Activity Tolerance good   Current Activity Tolerance good   Equipment Currently Used at Home cane, straight   Instrumental Activities of Daily Living (IADL)   Previous Responsibilities meal prep;housekeeping;laundry;medication management;finances;driving   IADL Comments Shared household tasks with spouse    Disability/Function   Fall history within last six months no   Change in Functional Status Since Onset of Current Illness/Injury yes   General Information   Onset of Illness/Injury or Date of Surgery 12/29/21   Referring Physician Lucas Dumont PA-C   Patient/Family Therapy Goal Statement (OT) Pt's goal is to d/c home    Additional Occupational Profile Info/Pertinent History of Current Problem Per chart: Pt is a 81 year old female s/p L4-L5 transforaminal lumbar interbody fusion.    Performance Patterns (Routines, Roles, Habits) Pt reports indep in all ADLs, IADLs and mobility tasks at baseline.    Existing Precautions/Restrictions fall;spinal;brace worn when out of bed   Cognitive Status Examination   Orientation Status orientation to person, place and time   Visual Perception   Visual Impairment/Limitations corrective lenses full-time   Pain Assessment   Patient Currently in Pain Yes, see Vital Sign flowsheet  (pain in back )   Bed Mobility    Bed Mobility supine-sit;sit-supine   Transfers   Transfers sit-stand transfer;toilet transfer;shower transfer   Sit-Stand Transfer   Sit-Stand [place] (Transfers) contact guard   Shower Transfer   [place] Level (Shower Transfer) contact guard   Toilet Transfer   [place] Level (Toilet Transfer) contact guard   Balance   Balance Comments SBA while ambulating without AD    Activities of Daily Living   BADL Assessment lower body dressing;upper body dressing;toileting   Upper Body Dressing Assessment   [place] Level (Upper Body Dressing) supervision   Lower Body Dressing Assessment   [place] Level (Lower Body Dressing) set up   Toileting   [place] Level (Toileting) supervision   Clinical Impression   Criteria for Skilled Therapeutic Interventions Met (OT) yes;meets criteria;skilled treatment is necessary   OT Diagnosis Impaired ADLs, IADLs and mobility tasks   OT Problem List-Impairments impacting ADL problems related to;activity tolerance impaired;pain;post-surgical precautions   ADL comments/analysis Pt below baseline level of functioning in daily tasks    Assessment of Occupational Performance 5 or more Performance Deficits   Identified Performance Deficits Bathing, dressing, grooming, toileting, homemaking, transfers   Planned Therapy Interventions (OT) ADL retraining;IADL retraining;strengthening;transfer training   Clinical Decision Making Complexity (OT) low complexity   Therapy Frequency (OT) 1x eval and treat   Risk & Benefits of therapy have been explained evaluation/treatment results reviewed;care plan/treatment goals reviewed;risks/benefits reviewed;current/potential barriers reviewed;participants voiced agreement with care plan;participants included;patient   OT Discharge Planning    OT Discharge Recommendation (DC Rec) Home with assist   OT Rationale for DC Rec Anticipate pt may require supervision with bathing and assist with IADLs (laundry, homemaking, driving, etc). Pt has  support of spouse as needed.    Total Evaluation Time (Minutes)   Total Evaluation Time (Minutes) 8

## 2021-12-30 NOTE — PLAN OF CARE
From PACU at 1830, A/O.  Mild pain tolerable, declined need PRN pain meds & cold pack.  No nausea.  LS clear bilaterally, O2, encouraged CDB hourly.  CMS intact.  Drsg CDI.  Oriented to room and call system, denies questions.

## 2021-12-30 NOTE — CONSULTS
[facility]  Consult Note - Hospitalist Service     Date of Admission:  12/29/2021      Assessment & Plan   Aurora Williamson is a 81 year old female admitted on 12/29/2021 for elective spine surgery for DDD/spondylolisthesis/radiculopathy of lumbar spine (L4-5).      Procedures:  1. L4-L5 transforaminal lumbar interbody fusion.  2.  Application of intervertebral biomechanical device for interbody fusion purposes.  3.  Posterior fusion using intertransverse membrane technique spanning from L4 to L5.  4.  Posterior instrumentation using bilateral pedicle screw and candace construct spanning from L4 to L5.  5.  Central laminectomy L4.  6.  Central laminectomy L5.  7.  Full facetectomy right L4-L5.  8.  Local autograft bone.  9.  Crushed cancellous allograft bone.  10.  Fluoroscopy.  11.  [employer] O-arm.    PMH: pertinent for amaurosis fugax, arthritis, PONV, subclavian aneurysm    PE:  Patient Vitals for the past 24 hrs:   BP Temp Temp src Pulse Resp SpO2   12/30/21 0822 -- -- -- -- -- 96 %   12/30/21 0733 103/45 98.8  F (37.1  C) Temporal 68 16 96 %   12/30/21 0408 105/41 97  F (36.1  C) Temporal 57 11 99 %   12/29/21 2331 116/50 97.6  F (36.4  C) Temporal 57 14 98 %   12/29/21 1835 112/52 98  F (36.7  C) Temporal 71 13 99 %   12/29/21 1800 111/67 -- -- -- -- 95 %   12/29/21 1700 110/65 98.4  F (36.9  C) Temporal 72 16 97 %   12/29/21 1630 110/58 98.1  F (36.7  C) Temporal 70 13 95 %   12/29/21 1600 103/45 -- -- -- -- --   12/29/21 1530 113/55 -- -- -- 14 98 %   12/29/21 1500 110/62 -- -- -- -- --   12/29/21 1446 104/63 97.8  F (36.6  C) Temporal 69 16 99 %   12/29/21 1430 97/52 97.2  F (36.2  C) Temporal 60 11 98 %   12/29/21 1415 102/51 -- -- 59 17 97 %   12/29/21 1400 104/58 -- -- 64 13 98 %   12/29/21 1345 102/51 97.7  F (36.5  C) Temporal 67 12 97 %   12/29/21 1333 -- -- -- -- -- 98 %   12/29/21 1330 90/45 -- -- 63 21 97 %   12/29/21 1325 98/51 -- -- 64 14 97 %   12/29/21 1320 100/51 -- -- 67  14 98 %   12/29/21 1315 103/50 -- -- 69 14 97 %   12/29/21 1310 108/52 -- -- 72 12 96 %   12/29/21 1305 97/52 -- -- 84 9 96 %   12/29/21 1300 108/68 -- -- 86 15 97 %   12/29/21 1255 95/54 -- -- 89 14 99 %   12/29/21 1250 (!) 89/69 -- -- 100 18 100 %   12/29/21 1245 128/52 99.2  F (37.3  C) Temporal 108 16 100 %        Hgb 10.8  Glucose 95      Discussed with nursing and chart review.  No acute issues. Passed therapies. Plan to discharge to home in the AM.     Medicine will not formally see given her clinical progress and stability.  No acute medical issues to address.  However, if any changes please page  pager [phone].     The patient's care was discussed with the Bedside Nurse.    NIC Cisneros [facility]  Securely message with the [facility] Web Console (learn more here)  Text page via "[employer] Paging/Directory

## 2021-12-30 NOTE — PLAN OF CARE
Pt A&O, vitals  monitored on RA.  UP SBA in room with brace on when OOB.  Pain managed with scheduled tylenol and repositioning.  CMS intact.  Campbell removed, pt voiding adequately.  Hemovac in place to be removed POD 2.  Dressing to back CDI. Pt to discharge to home Friday.

## 2021-12-30 NOTE — OP NOTE
Procedure Date: 12/29/2021    PREOPERATIVE DIAGNOSES:    1.  L4-L5 degenerative disk disease.  1.  L4-5 degenerative spondylolisthesis.  2.  Severe right L4 foraminal stenosis.  3.  Right L4 radiculopathy.    POSTOPERATIVE DIAGNOSES:    1.  L4-L5 degenerative disk disease.  1.  L4-5 degenerative spondylolisthesis.  2.  Severe right L4 foraminal stenosis.  3.  Right L4 radiculopathy.    PROCEDURES:    1.  L4-L5 transforaminal lumbar interbody fusion.  2.  Application of intervertebral biomechanical device for interbody fusion purposes.  3.  Posterior fusion using intertransverse membrane technique spanning from L4 to L5.  4.  Posterior instrumentation using bilateral pedicle screw and candace construct spanning from L4 to L5.  5.  Central laminectomy L4.  6.  Central laminectomy L5.  7.  Full facetectomy right L4-L5.  8.  Local autograft bone.  9.  Crushed cancellous allograft bone.  10.  Fluoroscopy.  11.  [employer] O-arm.    SURGEON:  Augustine Wyatt MD.    ASSISTANT:  Lucas Dumont PA-C.    ANESTHESIA:  General endotracheal anesthesia without complication.    COMPLICATIONS:  None.    DRAINS:  One Hemovac drain placed prior to closure deep to the lumbar fascia, taken out a separate poke hole and tied to the skin with nylon stitch to prevent backout.    ESTIMATED BLOOD LOSS:  25 mL, very minimal.    FINDINGS:  Full decompression L4-L5 with recreation of disk height and complete decompression of the right L4 foramen due to the facetectomy.  No complications.  Counts were correct prior to closure.    INDICATIONS FOR PROCEDURE:  Aurora is a 81-year-old female who has been followed by our [facility].  She has a longstanding history of right sided L4 type radiculopathy.  She is noted to have L4-L5 degenerative disk disease with degenerative spondylolisthesis as well as severe right sided disk space collapse (eccentric in nature) causing severe stenosis of the right L4 foramen.  Her symptoms were right L4  radicular in nature.  She failed conservative care, including observation, medications, therapies, injections and so on.  Based on the ongoing symptoms, we discussed the above-mentioned procedure as a possible means for improving or alleviating her right leg pain.  We discussed the risks, benefits and alternatives.  She elected to proceed.  She had been presented with a consent form, which was read, understood and signed.  All questions were answered.  She was referred for preoperative H and P and COVID testing.    DESCRIPTION OF PROCEDURE:  On the date of procedure, she was seen in the preoperative area.  Questions were answered.  Skin was marked.  Consent was signed by both parties.  After finding no contraindications, she was brought back to the operating room where she was successfully sedated and an ET tube was placed.  A Campbell catheter was placed under sterile conditions.  She was then transitioned to a prone position over a Jayy table routinely.  Eyes were free of compression.  SCDs were in place.  Shoulders and elbows were at 90 degrees with the shoulders slightly flexed forward.  The lumbar region was prepped and draped in standard fashion.  Timeout was performed and IV antibiotics were administered.    We began by localizing over L4-L5.  The midline was marked and infiltrated with Marcaine mixed with epinephrine.  A midline incision was created with a 10 blade.  I dissected down to the lumbar fascia, which was split in the midline.  She was noted to be extremely thin.  The fascia was split in the midline, exposing the spinous process.  A Kocher clamp was applied.  Lateral x-ray demonstrated L4 and we continued.  We reentered the wound and continued the subperiosteal dissection from L3 through L5.  L3 was exposed for placement of our spinous process clamp.  The dissection was taken down routinely to expose the transverse processes of L4 and L5 with removal of the L4-L5 capsules bilaterally.  Spinous  process clamp was placed on L3.  The [employer] O-arm was brought into the room.  A sterile spin was completed.  The information was then successfully transferred to the [employer] system without complications and we continued.  We reentered the wound at that point.  A Stealth-guided drill was used to create our  holes for our 4 pedicle screws.  This was then followed by a 4.5 mm tap.  We did not use the tap of the right L4 screw.  Proper pedicle screw sizes were identified.  The bilateral gutters were then decorticated with placement of a 1 x 5 MagniFuse to perform our posterior fusion from L4 to L5.  It should be noted that prior to completion of our procedure we did come back and backfill the gutters with continued local autograft bone and crushed cancellous allograft bone mixed with DBX.  In any case, this completed our posterior fusion procedure.  I then continued by placing our 4 pedicle screws.  Today, we used a PowerEase to place our 4 pedicle screws.  All screws were 6.5 mm in diameter, except for the right L4 screw, which was 4.5 mm in diameter.  L4 accepted 40 mm screws and L5 accepted a 30 mm screw on the left and 35 mm screw on the right.  Screws all had adequate endpoint.  Distractors were then applied.  Laminectomy was performed from L4 through L5 by first removing the posterior elements with a Leksell rongeur.  I then switched over to Midas Gigi bur to thin the lamina from L4 through L5 from the origin of the ligamentum flavum to its insertion.  Bone shavings were saved with a suction device and later incorporated into our bone graft mixture.  The ligamentum flavum was taken down to expose the thecal sac.  I then carefully performed a more aggressive right sided L4-L5 facetectomy as this was her symptomatic side.  When completed, we had removed the inferior articular process of L4 and the superior articular process of L5.  I continued to work from the patient's symptomatic side.  I freed up the  thecal sac from some chronic adhesions in that region and was able to medialize it with D'Errico retractor.  Cottonoids were placed proximally and distally to protect the neurologic elements.  The posterior disk annulus was identified.  We reassured our location with passive planar probe.  I then performed a right sided L4-L5 diskectomy with standard instruments of a 15 blade, pituitary, disk nic and curettes.  The diskectomy was performed down to the bleeding bony endplates to the best of our abilities.  I was able to perform some trialing at that point. Once the diskectomy was completed, I then backfilled the disk with bone graft material for interbody fusion purposes.  This was then followed by our Globus RISE titanium interbody expandable cage 10 x 26.  This was expandable from 7-14 mm.  I did expand on the right side as this was her symptomatic side and the bone was quite sclerotic on that right side.  This completed our interbody fusion.  We verified its location and continued.  We completed our instrumentation by placing 2 prebent 5.5 mm titanium rods spanning from L4 to L5.  These were 40 mm in length.  Proper length was identified.  Four end caps were applied and final tightened with an antitorque wrench.  This completed our instrumentation.  Final x-rays were obtained in AP and lateral projection showing excellent placement of our hardware and recreation of disk height.  The wound was copiously irrigated and suctioned one last time.  Spinous process clamp had been removed.  We then completed our posterior fusion by backfilling the gutters with local autograft bone mixed with crushed cancellous allograft bone and DBX.  All counts were correct at this point.  One last investigation did not reveal any evidence of ongoing stenosis through the L4-L5.  There were no notable complications otherwise.  No CSF leak.  Very minimal bleeding during the procedure.  A medium Hemovac drain was placed deep to the  lumbar fascia.  This was taken out a separate poke hole and tied to the skin with nylon stitch to prevent backout.  The wound was then closed in layers including the lumbar fascia, subcutaneous tissue and skin.  The wound was cleaned and dried.  Dermabond was applied.  Sterile dressings were applied.  Drapes were taken down and she was rolled back into supine position, extubated, and brought to the PACU in stable condition.    Lucas Dumont PA-C, was present through the entire procedure and was present from start to finish.  He was absolutely necessary for performing the procedure today.  We used Globus Creo HA coated screws today.  We did use a Globus expandable titanium interbody cage with 4 standard locking end caps.  We used 15 mL of crushed cancellous allograft bone mixed with 10 mL of DBX as well as local autograft bone.  We used [employer] O-arm and fluoroscopy.  No notable complications.  Counts were correct prior to closure.    Augustine Wyatt MD        D: 2021   T: 2021   MT: [id number]    Name:     LINDA FREDERICKRe  MRN:      [id number]        Account:        [id number]   :      1940           Procedure Date: 2021     Document: [id number]

## 2021-12-30 NOTE — PROGRESS NOTES
12/30/21 1020   Quick Adds   Type of Visit Initial PT Evaluation   Living Environment   People in home spouse   Current Living Arrangements house   Home Accessibility stairs to enter home;stairs within home   Number of Stairs, Main Entrance 2   Stair Railings, Main Entrance railings safe and in good condition   Number of Stairs, Within Home, Primary greater than 10 stairs   Stair Railings, Within Home, Primary railings safe and in good condition   Transportation Anticipated car, drives self   Self-Care   Equipment Currently Used at Home cane, straight   Activity/Exercise/Self-Care Comment Pt reports no AD prior to surgery. Pt does most of the IADLs typically, but  can assist as needed.    General Information   Onset of Illness/Injury or Date of Surgery 12/29/21   Referring Physician Dr. Wyatt   Patient/Family Therapy Goals Statement (PT) Pt hopeful to DC home tomorrow   Pertinent History of Current Problem (include personal factors and/or comorbidities that impact the POC) Pt is status post Lumbar 4-5 posterior lumbar instrumented fusion with  interbody cage and with laminectomies   Existing Precautions/Restrictions spinal;brace worn when out of bed   Cognition   Orientation Status (Cognition) oriented x 4   Affect/Mental Status (Cognition) WNL   Follows Commands (Cognition) WNL   Pain Assessment   Patient Currently in Pain Yes, see Vital Sign flowsheet   Range of Motion (ROM)   ROM Quick Adds ROM deficits secondary to surgical procedure;ROM deficits secondary to pain   Strength   Manual Muscle Testing Quick Adds Deficits observed during functional mobility   Bed Mobility   Comment (Bed Mobility) min A   Transfers   Transfer Safety Comments min A   Gait/Stairs (Locomotion)   Comment (Gait/Stairs) CGA/SBA with FWW   Balance   Balance Comments needing either FWW or HHA with ambulation at this time   Clinical Impression   Criteria for Skilled Therapeutic Intervention yes, treatment indicated   PT Diagnosis  (PT) decreased functional mobility   Influenced by the following impairments decreased ROM/spinal prec, decreased strength   Functional limitations due to impairments decreased bed mob, transfers, ambulation, stairs   Clinical Presentation Stable/Uncomplicated   Clinical Presentation Rationale improving   Clinical Decision Making (Complexity) low complexity   Therapy Frequency (PT) 2x/day   Predicted Duration of Therapy Intervention (days/wks) 3 days   Planned Therapy Interventions (PT) bed mobility training;balance training;gait training;home exercise program;patient/family education;stair training;strengthening;transfer training;risk factor education;home program guidelines;progressive activity/exercise   Anticipated Equipment Needs at Discharge (PT) walker, rolling   Risk & Benefits of therapy have been explained evaluation/treatment results reviewed;care plan/treatment goals reviewed;risks/benefits reviewed;current/potential barriers reviewed;participants voiced agreement with care plan;participants included;patient;spouse/significant other   PT Discharge Planning    PT Discharge Recommendation (DC Rec) home with assist;home with outpatient physical therapy   PT Rationale for DC Rec Pt likely to meet inpt PT goals for DC home with  assist with IADLs. Defer to OT re: ADLs. May need FWW vs using own cane at home.    PT Brief overview of current status  200 feet of ambulation with FWW or HHA   Total Evaluation Time   Total Evaluation Time (Minutes) 5

## 2021-12-31 VITALS
DIASTOLIC BLOOD PRESSURE: 55 MMHG | SYSTOLIC BLOOD PRESSURE: 129 MMHG | HEIGHT: 63 IN | HEART RATE: 75 BPM | BODY MASS INDEX: 18.54 KG/M2 | RESPIRATION RATE: 16 BRPM | TEMPERATURE: 98.6 F | OXYGEN SATURATION: 95 % | WEIGHT: 104.6 LBS

## 2021-12-31 LAB — HGB BLD-MCNC: 11.9 G/DL (ref 11.7–15.7)

## 2021-12-31 PROCEDURE — 85018 HEMOGLOBIN: CPT | Performed by: PHYSICIAN ASSISTANT

## 2021-12-31 PROCEDURE — 250N000013 HC RX MED GY IP 250 OP 250 PS 637: Performed by: PHYSICIAN ASSISTANT

## 2021-12-31 PROCEDURE — 36415 COLL VENOUS BLD VENIPUNCTURE: CPT | Performed by: PHYSICIAN ASSISTANT

## 2021-12-31 RX ORDER — OXYCODONE HYDROCHLORIDE 5 MG/1
5 TABLET ORAL EVERY 6 HOURS PRN
Qty: 15 TABLET | Refills: 0 | Status: SHIPPED | OUTPATIENT
Start: 2021-12-31 | End: 2022-01-03

## 2021-12-31 RX ADMIN — SENNOSIDES AND DOCUSATE SODIUM 1 TABLET: 50; 8.6 TABLET ORAL at 08:08

## 2021-12-31 RX ADMIN — Medication 1 TABLET: at 12:02

## 2021-12-31 RX ADMIN — Medication 1 TABLET: at 08:08

## 2021-12-31 RX ADMIN — Medication 25 MCG: at 08:08

## 2021-12-31 RX ADMIN — ACETAMINOPHEN 975 MG: 325 TABLET, FILM COATED ORAL at 09:44

## 2021-12-31 RX ADMIN — OXYCODONE HYDROCHLORIDE 5 MG: 5 TABLET ORAL at 06:11

## 2021-12-31 RX ADMIN — POLYETHYLENE GLYCOL 3350 17 G: 17 POWDER, FOR SOLUTION ORAL at 08:09

## 2021-12-31 ASSESSMENT — ACTIVITIES OF DAILY LIVING (ADL)
ADLS_ACUITY_SCORE: 6
ADLS_ACUITY_SCORE: 4
ADLS_ACUITY_SCORE: 6
ADLS_ACUITY_SCORE: 4
ADLS_ACUITY_SCORE: 6
ADLS_ACUITY_SCORE: 4

## 2021-12-31 NOTE — PLAN OF CARE
Independent in room. A&Ox4.  VSS. O2 - 93% on RA. LS clear. Bowel sounds normoactive. LBM - Tuesday.   Voiding output 550cc clear, yellow. 30cc bright red blood from Hemovac.   Denied pain and refused scheduled Tylenol at 0030. Requested a prn Oxycodone 5mg at 0600. Must eat something before taking medication or pt states she will be get nauseous. IV SL. CMS intact.

## 2021-12-31 NOTE — PROGRESS NOTES
Orthopedic Surgery  Aurora Williamson  2021  Admit Date:  2021  POD # 2 s/p L$-5 TLIF    Aurora Williamson is a 82 y/o female whom was rounded on 2 days s/p L4-5 TLIF.  Pain controlled but had to take Oxycodone last night.  Tolerating oral intake.  No events overnight. The patient denies chest pain, SOB, calf pain.    Alert and orient to person, place, and time.  Vital Sign Ranges  Temperature Temp  Av.4  F (36.9  C)  Min: 98.2  F (36.8  C)  Max: 98.6  F (37  C)   Blood pressure Systolic (24hrs), Av , Min:110 , Max:129        Diastolic (24hrs), Av, Min:55, Max:61      Pulse Pulse  Av  Min: 75  Max: 83   Respirations Resp  Av.3  Min: 16  Max: 18   Pulse oximetry SpO2  Av.3 %  Min: 93 %  Max: 96 %       Back dressing dressing is clean, dry, and intact. Minimal erythema of the surrounding skin and no signs of infection  Bilateral calves are soft, non-tender.  bilateral lower extremity is NVI. 2+ DP/PT pulses   Able to actively move distal extremity  5/5 strength distally    Labs:  No results for input(s): POTASSIUM in the last 31340 hours.  Recent Labs   Lab Test 21  0635 21  0628 21  0803   HGB 11.9 10.8* 13.8     No results for input(s): INR in the last 69354 hours.  No results for input(s): PLT in the last 40390 hours.    A/P  1. POD #2 s/p L4-5 TLIF   [facility].     Mobilize with PT/OT WBAT.     Continue current pain regiment will change home Tramadol to Oxycodone per patient request but minimal dosage.    2. Disposition   Anticipate d/c to home today pending continual strengthening and ambulation with PT.    Bruce Murguia PA-C  [phone]

## 2021-12-31 NOTE — PLAN OF CARE
Pt a/o x4. VSS. C/o pain, scheduled tylenol given. CMS intact. Hemovac was pulled. New dressing applied on back incision. Went over discharge instructions with pt. Pt verbalized understanding. All questions answered. Sent 3 medications home with pt. All belongings sent home with pt. Pt discharged home with .

## 2022-01-12 NOTE — DISCHARGE SUMMARY
[facility] Discharge Summary    Aurora Williamson MRN# [id number]   Age: 82 year old YOB: 1940     Date of Admission:  12/29/2021  Date of Discharge::  12/31/2021  1:37 PM  Admitting Physician:  Augustine Wyatt MD  Discharge Physician:  Lucas Dumont PAMelodyC     Home clinic: [facility]           Admission Diagnoses:   DJD (degenerative joint disease) [M19.90]  Stenosis, spinal, lumbar [M48.061]  Radiculopathy [M54.10]  Spondylolisthesis of lumbar region [M43.16]  S/P lumbar fusion [Z98.1]          Discharge Diagnosis:   Patient Active Problem List    Diagnosis     S/P lumbar fusion             Procedures:   Procedure(s): L4-5 Transforaminal lumbar interbody fusion                 Medications Prior to Admission:     No medications prior to admission.             Discharge Medications:     Discharge Medication List as of 12/31/2021 11:02 AM      START taking these medications    Details   acetaminophen (TYLENOL) 325 MG tablet Take 2 tablets (650 mg) by mouth every 4 hours as needed for other (mild pain), Disp-100 tablet, R-0, Local Print      oxyCODONE (ROXICODONE) 5 MG tablet Take 1 tablet (5 mg) by mouth every 6 hours as needed for pain, Disp-15 tablet, R-0, E-Prescribe      senna-docusate (SENOKOT-S/PERICOLACE) 8.6-50 MG tablet Take 1-2 tablets by mouth 2 times daily Take while on oral narcotics to prevent or treat constipation., Disp-30 tablet, R-0, Local PrintWhile taking narcotics         CONTINUE these medications which have NOT CHANGED    Details   Calcium-Magnesium-Vitamin D (CALCIUM MAGNESIUM PO) Take 2 capsules by mouth every morning, Historical      Vitamin D-Vitamin K (VITAMIN K2-VITAMIN D3)  MCG-UNIT CAPS Take 1 tablet by mouth daily, Historical         STOP taking these medications       aspirin (ASA) 81 MG EC tablet Comments:   Reason for Stopping:                     Consultations:   PT, OT, Hospitalist               Hospital Course:   The patient's  hospital course was unremarkable.  She recovered as anticipated and experienced no post-operative complications.           Discharge Instructions and Follow-Up:   Discharge diet: Regular   Discharge activity: Activity as tolerated  Minimize bending, lifting, twisting. No lifting greater than 10 lbs. Remember, 1 gallon of milk is 8 lbs.     Discharge follow-up: Follow up with Lucas Dumont PA-C in two weeks at [facility]. Please call Rachelle [phone] to schedule.      Wound care: Your incision is covered with an Aquacel dressing. This is a waterproof dressing that you are able to shower with. Do not submerge your dressing in water. Do not remove dressing until you are seen in clinic for your two week post op visit.     However, if you notice a significant amount of drainage on your dressing, or there is any concern for possible incision infection, remove to inspect the incision.    If you do remove the dressing prior to your two week visit, please cover with simple dressing. We suggest a folded piece of gauze with a few pieces of tape to hold in place. This will allow the incision to remain protected. Please make sure to cover your dressing with plastic/waterproof dressing to keep it waterproof if you have removed the initial dressing while in the shower. We ask that you continue to waterproof it until you are seen at your two week post op appointment.              Discharge Disposition:   Discharged to home      Attestation:  I have reviewed today's vital signs, notes, medications, labs and imaging.    Lucas Dumont PA-C

## (undated) DEVICE — DRSG AQUACEL AG 3.5X6.0" HYDROFIBER 412010

## (undated) DEVICE — PEN MARKING SKIN

## (undated) DEVICE — SU ETHILON 2-0 PS 18" 585H

## (undated) DEVICE — SU STRATAFIX PDS PLUS 1 CT-1 12" SXPP1A443

## (undated) DEVICE — GLOVE PROTEXIS BLUE W/NEU-THERA 8.5  2D73EB85

## (undated) DEVICE — DRAPE STERI TOWEL LG 1010

## (undated) DEVICE — SUCTION FRAZIER 12FR W/OBTURATOR 33120

## (undated) DEVICE — CATH IV ANGIO INTRO 12GA 382277

## (undated) DEVICE — SPONGE SURGIFOAM 01GM POWDER 1978

## (undated) DEVICE — LINEN POUCH DBL 5427

## (undated) DEVICE — SU VICRYL 2-0 CT-1 27" UND J259H

## (undated) DEVICE — DRAPE LAP W/ARMBOARD 29410

## (undated) DEVICE — ESU PENCIL W/SMOKE EVAC CVPLP2000

## (undated) DEVICE — SOL NACL 0.9% IRRIG 1000ML BOTTLE 2F7124

## (undated) DEVICE — ESU GROUND PAD ADULT W/CORD E7507

## (undated) DEVICE — LINEN DRAPE 54X72" 5467

## (undated) DEVICE — SU MONOCRYL 3-0 PS-2 27" Y427H

## (undated) DEVICE — SYR 10ML FINGER CONTROL W/O NDL 309695

## (undated) DEVICE — ESU ELEC BLADE 2.75" COATED/INSULATED E1455

## (undated) DEVICE — SUCTION MANIFOLD NEPTUNE 2 SYS 4 PORT 0702-020-000

## (undated) DEVICE — SOL NACL 0.9% 10ML VIAL 0409-4888-02

## (undated) DEVICE — DEVICE DUST COLLECTOR BONE BOX S-3500

## (undated) DEVICE — DRAIN HEMOVAC RESERVOIR KIT 10FR 1/8" MED 00-2550-002-10

## (undated) DEVICE — NDL BLUNT 18GA 1" W/O FILTER 305181

## (undated) DEVICE — PACK SMALL SPINE RIDGES

## (undated) DEVICE — CATH TRAY FOLEY SURESTEP 16FR DRAIN BAG STATOCK A899916

## (undated) DEVICE — GOWN IMPERVIOUS SPECIALTY XLG/XLONG 32474

## (undated) DEVICE — PAD PROAXIS TABLE KIT SPK10182

## (undated) DEVICE — DRSG TEGADERM 4X4 3/4" 1626W

## (undated) DEVICE — SPONGE COTTONOID 1/2X1" 80-1402

## (undated) DEVICE — MARKER SPHERES PASSIVE MEDT PACK 5 8801075

## (undated) DEVICE — DRAPE COVER C-ARM SEAMLESS SNAP-KAP 03-KP26 LATEX FREE

## (undated) DEVICE — GLOVE PROTEXIS W/NEU-THERA 8.0  2D73TE80

## (undated) DEVICE — PREP DURAPREP 26ML APL 8630

## (undated) DEVICE — ADH SKIN CLOSURE PREMIERPRO EXOFIN 1.0ML 3470

## (undated) DEVICE — TOOL DISSECT MIDAS MR8 14CM MATCH HEAD 3MM MR8-14MH30

## (undated) DEVICE — CUSHION INSERT LG PRONE VIEW JACKSON TABLE

## (undated) DEVICE — LINEN ORTHO ACL PACK 5447

## (undated) DEVICE — SYR 03ML LL W/O NDL 309657

## (undated) DEVICE — SUCTION TIP YANKAUER W/O VENT K86

## (undated) DEVICE — PACK SET-UP STD 9102

## (undated) RX ORDER — EPHEDRINE SULFATE 50 MG/ML
INJECTION, SOLUTION INTRAMUSCULAR; INTRAVENOUS; SUBCUTANEOUS
Status: DISPENSED
Start: 2021-12-29

## (undated) RX ORDER — PROPOFOL 10 MG/ML
INJECTION, EMULSION INTRAVENOUS
Status: DISPENSED
Start: 2021-12-29

## (undated) RX ORDER — GABAPENTIN 100 MG/1
CAPSULE ORAL
Status: DISPENSED
Start: 2021-12-29

## (undated) RX ORDER — FENTANYL CITRATE 50 UG/ML
INJECTION, SOLUTION INTRAMUSCULAR; INTRAVENOUS
Status: DISPENSED
Start: 2021-12-29

## (undated) RX ORDER — NEOSTIGMINE METHYLSULFATE 1 MG/ML
VIAL (ML) INJECTION
Status: DISPENSED
Start: 2021-12-29

## (undated) RX ORDER — FENTANYL CITRATE-0.9 % NACL/PF 10 MCG/ML
PLASTIC BAG, INJECTION (ML) INTRAVENOUS
Status: DISPENSED
Start: 2021-12-29

## (undated) RX ORDER — CEFAZOLIN SODIUM/WATER 2 G/20 ML
SYRINGE (ML) INTRAVENOUS
Status: DISPENSED
Start: 2021-12-29

## (undated) RX ORDER — DEXAMETHASONE SODIUM PHOSPHATE 4 MG/ML
INJECTION, SOLUTION INTRA-ARTICULAR; INTRALESIONAL; INTRAMUSCULAR; INTRAVENOUS; SOFT TISSUE
Status: DISPENSED
Start: 2021-12-29

## (undated) RX ORDER — BUPIVACAINE HYDROCHLORIDE AND EPINEPHRINE 2.5; 5 MG/ML; UG/ML
INJECTION, SOLUTION EPIDURAL; INFILTRATION; INTRACAUDAL; PERINEURAL
Status: DISPENSED
Start: 2021-12-29

## (undated) RX ORDER — GLYCOPYRROLATE 0.2 MG/ML
INJECTION INTRAMUSCULAR; INTRAVENOUS
Status: DISPENSED
Start: 2021-12-29

## (undated) RX ORDER — ONDANSETRON 2 MG/ML
INJECTION INTRAMUSCULAR; INTRAVENOUS
Status: DISPENSED
Start: 2021-12-29

## (undated) RX ORDER — LIDOCAINE HYDROCHLORIDE 10 MG/ML
INJECTION, SOLUTION EPIDURAL; INFILTRATION; INTRACAUDAL; PERINEURAL
Status: DISPENSED
Start: 2021-12-29